# Patient Record
Sex: FEMALE | Race: WHITE | HISPANIC OR LATINO | Employment: UNEMPLOYED | URBAN - METROPOLITAN AREA
[De-identification: names, ages, dates, MRNs, and addresses within clinical notes are randomized per-mention and may not be internally consistent; named-entity substitution may affect disease eponyms.]

---

## 2022-08-15 ENCOUNTER — HOSPITAL ENCOUNTER (EMERGENCY)
Facility: MEDICAL CENTER | Age: 22
End: 2022-08-15
Attending: EMERGENCY MEDICINE

## 2022-08-15 ENCOUNTER — APPOINTMENT (OUTPATIENT)
Dept: RADIOLOGY | Facility: MEDICAL CENTER | Age: 22
End: 2022-08-15
Attending: EMERGENCY MEDICINE

## 2022-08-15 VITALS
RESPIRATION RATE: 17 BRPM | HEIGHT: 69 IN | HEART RATE: 99 BPM | WEIGHT: 152.12 LBS | SYSTOLIC BLOOD PRESSURE: 154 MMHG | TEMPERATURE: 97.2 F | OXYGEN SATURATION: 95 % | BODY MASS INDEX: 22.53 KG/M2 | DIASTOLIC BLOOD PRESSURE: 90 MMHG

## 2022-08-15 DIAGNOSIS — S91.209A AVULSION OF TOENAIL, INITIAL ENCOUNTER: ICD-10-CM

## 2022-08-15 DIAGNOSIS — S93.601A FOOT SPRAIN, RIGHT, INITIAL ENCOUNTER: ICD-10-CM

## 2022-08-15 PROCEDURE — 73610 X-RAY EXAM OF ANKLE: CPT | Mod: RT

## 2022-08-15 PROCEDURE — 73630 X-RAY EXAM OF FOOT: CPT | Mod: RT

## 2022-08-15 PROCEDURE — 700102 HCHG RX REV CODE 250 W/ 637 OVERRIDE(OP)

## 2022-08-15 PROCEDURE — 700102 HCHG RX REV CODE 250 W/ 637 OVERRIDE(OP): Performed by: EMERGENCY MEDICINE

## 2022-08-15 PROCEDURE — 99284 EMERGENCY DEPT VISIT MOD MDM: CPT

## 2022-08-15 PROCEDURE — 304217 HCHG IRRIGATION SYSTEM

## 2022-08-15 PROCEDURE — A9270 NON-COVERED ITEM OR SERVICE: HCPCS

## 2022-08-15 PROCEDURE — A9270 NON-COVERED ITEM OR SERVICE: HCPCS | Performed by: EMERGENCY MEDICINE

## 2022-08-15 RX ORDER — IBUPROFEN 600 MG/1
600 TABLET ORAL ONCE
Status: COMPLETED | OUTPATIENT
Start: 2022-08-15 | End: 2022-08-15

## 2022-08-15 RX ORDER — ACETAMINOPHEN 325 MG/1
650 TABLET ORAL ONCE
Status: COMPLETED | OUTPATIENT
Start: 2022-08-15 | End: 2022-08-15

## 2022-08-15 RX ADMIN — ACETAMINOPHEN 650 MG: 325 TABLET, FILM COATED ORAL at 01:36

## 2022-08-15 RX ADMIN — IBUPROFEN 600 MG: 600 TABLET ORAL at 00:42

## 2022-08-15 ASSESSMENT — PAIN DESCRIPTION - PAIN TYPE
TYPE: ACUTE PAIN
TYPE: ACUTE PAIN

## 2022-08-15 NOTE — ED TRIAGE NOTES
Chief Complaint   Patient presents with   • Toe Pain     Right foot pinky toe injury     Patient to triage in  for the above complaint. Patient has been drinking tonight and does not recall how she injured toe. Right pinky toe nail noted to be missing in triage, bleeding controlled. Bruising noted to right shin.    Patient able to move toes, foot, and ankle but noted to be in pain with movement. CMS intact.    Protocol ordered.    Pt is alert and oriented, speaking in full sentences, follows commands and responds appropriately to questions. Resp are even and unlabored.      Pt placed in lobby. Pt educated on triage process. Pt encouraged to alert staff for any changes.     Patient and staff wearing appropriate PPE.

## 2022-08-15 NOTE — ED PROVIDER NOTES
"ED Provider Note    CHIEF COMPLAINT  Chief Complaint   Patient presents with    Toe Pain     Right foot pinky toe injury       HPI  Kesha Roque is a 22 y.o. female who [facility] the [facility] chief complaint of right foot ankle and toe pain.  She was intoxicated this evening and somehow tore the nail off of her pinky toe but does not recall how it happened.  Her tetanus is up-to-date she did not take anything for pain prior to arrival and just wanted to make sure everything was okay.  Pain is about a 4 out of 10 worse with ambulation.    REVIEW OF SYSTEMS  Positives as above. Pertinent negatives include easy bleeding or bruising  All other review of systems are negative    PAST MEDICAL HISTORY       SOCIAL HISTORY  Social History     Tobacco Use    Smoking status: Never    Smokeless tobacco: Never   Vaping Use    Vaping Use: Never used   Substance and Sexual Activity    Alcohol use: Yes    Drug use: Yes     Types: Inhaled     Comment: marijuana    Sexual activity: Not on file       SURGICAL HISTORY  patient denies any surgical history    CURRENT MEDICATIONS  Home Medications       Reviewed by Lorne Munoz R.N. (Registered Nurse) on 08/15/22 at 0034  Med List Status: <None>     Medication Last Dose Status        Patient Rafael Taking any Medications                           ALLERGIES  No Known Allergies    PHYSICAL EXAM  VITAL SIGNS: BP (!) 170/95   Pulse (!) 117   Temp 35.9 °C (96.7 °F) (Temporal)   Resp 20   Ht 1.75 m (5' 8.9\")   Wt 69 kg (152 lb 1.9 oz)   SpO2 98%   BMI 22.53 kg/m²    Pulse ox interpretation: I interpret this pulse ox as normal.  Constitutional: Alert in no apparent distress.  HENT: Normocephalic, Atraumatic, MMM  Eyes: PERound. Conjunctiva normal, non-icteric.   Heart: Regular rate and rhythm DP pulse 2+  EXT: Right foot the fifth toenail is completely missing there is no active bleeding she had a little bit tenderness of the toe as well as the midfoot no swelling " "really noted  Skin: Warm, Dry, No erythema, No rash.   Neurologic: Alert and oriented, Grossly non-focal.       DIFFERENTIAL DIAGNOSIS AND WORK UP PLAN    This is a 22 y.o. female who presents with toenail avulsion and some midfoot pain x-rays were already ordered at triage and performed prior to my evaluation so waiting on the reads but I did review the myself and they appear normal.  Ibuprofen and Tylenol were given for pain    Pertinent Lab Findings  Labs Reviewed - No data to display    Radiology  DX-FOOT-COMPLETE 3+ RIGHT   Final Result         1.  No acute traumatic bony injury.   2.  Pes planus      DX-ANKLE 3+ VIEWS RIGHT   Final Result         1.  No acute traumatic bony injury.           The radiologist's interpretation of all radiological studies have been reviewed by me.    COURSE & MEDICAL DECISION MAKING  Pertinent Labs & Imaging studies reviewed. (See chart for details)    I reassessed patient at the bedside we discussed her imaging findings with a sprain and strain on Profen Tylenol ice packs and the wound care for her missing toenail.    BP (!) 154/90   Pulse 99   Temp 36.2 °C (97.2 °F) (Temporal)   Resp 17   Ht 1.75 m (5' 8.9\")   Wt 69 kg (152 lb 1.9 oz)   SpO2 95%   BMI 22.53 kg/m²       I verified that the patient was wearing a mask and I was wearing appropriate PPE every time I entered the room. The patient's mask was on the patient at all times during my encounter except for a brief view of the oropharynx.    The patient will return for new or worsening symptoms and is stable at the time of discharge.    The patient is referred to a primary physician for blood pressure management, diabetic screening, and for all other preventative health concerns.    DISPOSITION:  Patient will be discharged home in stable condition.    FOLLOW UP:  [facility], Emergency Dept  [address] [facility] [address] [phone]    If symptoms worsen      OUTPATIENT " MEDICATIONS:  There are no discharge medications for this patient.          FINAL IMPRESSION  1. Avulsion of toenail, initial encounter        2. Foot sprain, right, initial encounter                   Electronically signed by: Vicky Cook M.D., 8/15/2022 1:01 AM    This dictation has been created using voice recognition software and/or scribes. The accuracy of the dictation is limited by the abilities of the software and the expertise of the scribes. I expect there may be some errors of grammar and possibly content. I made every attempt to manually correct the errors within my dictation. However, errors related to voice recognition software and/or scribes may still exist and should be interpreted within the appropriate context.